# Patient Record
Sex: FEMALE
[De-identification: names, ages, dates, MRNs, and addresses within clinical notes are randomized per-mention and may not be internally consistent; named-entity substitution may affect disease eponyms.]

---

## 2017-01-27 ENCOUNTER — RX ONLY (OUTPATIENT)
Age: 82
Setting detail: RX ONLY
End: 2017-01-27

## 2017-01-27 ENCOUNTER — APPOINTMENT (RX ONLY)
Dept: URBAN - METROPOLITAN AREA CLINIC 106 | Facility: CLINIC | Age: 82
Setting detail: DERMATOLOGY
End: 2017-01-27

## 2017-01-27 DIAGNOSIS — L29.89 OTHER PRURITUS: ICD-10-CM

## 2017-01-27 DIAGNOSIS — R21 RASH AND OTHER NONSPECIFIC SKIN ERUPTION: ICD-10-CM

## 2017-01-27 DIAGNOSIS — F42.4 EXCORIATION (SKIN-PICKING) DISORDER: ICD-10-CM

## 2017-01-27 PROBLEM — S20.409A UNSPECIFIED SUPERFICIAL INJURIES OF UNSPECIFIED BACK WALL OF THORAX, INITIAL ENCOUNTER: Status: ACTIVE | Noted: 2017-01-27

## 2017-01-27 PROBLEM — L29.8 OTHER PRURITUS: Status: ACTIVE | Noted: 2017-01-27

## 2017-01-27 PROCEDURE — ? COUNSELING

## 2017-01-27 PROCEDURE — 11100: CPT

## 2017-01-27 PROCEDURE — ? BIOPSY BY SHAVE METHOD

## 2017-01-27 PROCEDURE — 99213 OFFICE O/P EST LOW 20 MIN: CPT | Mod: 25

## 2017-01-27 RX ORDER — BETAMETHASONE DIPROPIONATE 0.5 MG/G
CREAM TOPICAL
Qty: 1 | Refills: 2 | Status: ERX

## 2017-01-27 ASSESSMENT — LOCATION SIMPLE DESCRIPTION DERM
LOCATION SIMPLE: LEFT UPPER BACK
LOCATION SIMPLE: RIGHT UPPER BACK

## 2017-01-27 ASSESSMENT — LOCATION DETAILED DESCRIPTION DERM
LOCATION DETAILED: LEFT SUPERIOR UPPER BACK
LOCATION DETAILED: RIGHT INFERIOR MEDIAL UPPER BACK
LOCATION DETAILED: RIGHT SUPERIOR UPPER BACK

## 2017-01-27 ASSESSMENT — LOCATION ZONE DERM: LOCATION ZONE: TRUNK

## 2017-01-27 NOTE — PROCEDURE: BIOPSY BY SHAVE METHOD
Dressing: pressure dressing
Cryotherapy Text: The wound bed was treated with cryotherapy after the biopsy was performed.
Render Post-Care Instructions In Note?: no
Consent: Written consent was obtained and risks were reviewed including but not limited to scarring, infection, bleeding, scabbing, incomplete removal, nerve damage and allergy to anesthesia.
Post-Care Instructions: I reviewed with the patient in detail post-care instructions. Patient is to keep the biopsy site dry overnight, and then apply bacitracin twice daily until healed. Patient may apply hydrogen peroxide soaks to remove any crusting.
Anesthesia Type: 1% lidocaine with epinephrine
Silver Nitrate Text: The wound bed was treated with silver nitrate after the biopsy was performed.
Biopsy Type: H and E
Hemostasis: Electrocautery
Electrodesiccation And Curettage Text: The wound bed was treated with electrodesiccation and curettage after the biopsy was performed.
Body Location Override (Optional - Billing Will Still Be Based On Selected Body Map Location If Applicable): Right mid back
Additional Anesthesia Volume In Cc (Will Not Render If 0): 0
Electrodesiccation Text: The wound bed was treated with electrodesiccation after the biopsy was performed.
Detail Level: Detailed
Lab Facility: [address] [facility]
Notification Instructions: Patient will be notified of biopsy results. However, patient instructed to call the office if not contacted within 2 weeks.
Lab: [facility]
Size Of Lesion In Cm: 0.7
Billing Type: [employer]
Wound Care: Vaseline
Curettage Text: The wound bed was treated with curettage after the biopsy was performed.
Anesthesia Volume In Cc (Will Not Render If 0): 0.5
Biopsy Method: Personna blade
Type Of Destruction Used: Curettage

## 2017-02-10 ENCOUNTER — APPOINTMENT (RX ONLY)
Dept: URBAN - METROPOLITAN AREA CLINIC 106 | Facility: CLINIC | Age: 82
Setting detail: DERMATOLOGY
End: 2017-02-10

## 2017-02-10 DIAGNOSIS — L27.0 GENERALIZED SKIN ERUPTION DUE TO DRUGS AND MEDICAMENTS TAKEN INTERNALLY: ICD-10-CM

## 2017-02-10 PROCEDURE — ? COUNSELING

## 2017-02-10 PROCEDURE — 99213 OFFICE O/P EST LOW 20 MIN: CPT

## 2017-02-10 ASSESSMENT — LOCATION SIMPLE DESCRIPTION DERM: LOCATION SIMPLE: RIGHT UPPER BACK

## 2017-02-10 ASSESSMENT — LOCATION DETAILED DESCRIPTION DERM: LOCATION DETAILED: RIGHT SUPERIOR MEDIAL UPPER BACK

## 2017-02-10 ASSESSMENT — LOCATION ZONE DERM: LOCATION ZONE: TRUNK

## 2017-02-10 NOTE — PROCEDURE: MIPS QUALITY
Quality 130: Documentation Of Current Medications In The Medical Record: Current Medications Documented
Detail Level: Detailed
Quality 226: Preventive Care And Screening: Tobacco Use: Screening And Cessation Intervention: Patient screened for tobacco and never smoked
Quality 111:Pneumonia Vaccination Status For Older Adults: Pneumococcal Vaccination Previously Received
Quality 47: Advance Care Plan: Advance Care Planning discussed and documented in the medical record; patient did not wish or was not able to name a surrogate decision maker or provide an advance care plan.
Quality 110: Preventive Care And Screening: Influenza Immunization: Influenza Immunization previously received during influenza season
Quality 131: Pain Assessment And Follow-Up: Pain assessment using a standardized tool is documented as negative, no follow-up plan required

## 2019-07-15 NOTE — PROCEDURE: COUNSELING
[facility] -- Elida Douglas, Ayaz Macias Pannella, Patel, Savella  Office # [phone]    Follow Up:  SOB, Dyspnea      Subjective/Observations: Awake and alert, denies any respiratory discomfort, no SOB, MUNOZ.  Remains non-orthopneic.  Denies CP or palpitations    REVIEW OF SYSTEMS: All other review of systems is negative unless indicated above  PAST MEDICAL & SURGICAL HISTORY:  Myeloproliferative disease  Anal fissure  Elevated red blood cell count  Atrial fibrillation: on coumadin  Skin cancer: of back - removed  Type 2 diabetes mellitus: not on insulin  Hypercholesterolemia  HTN (hypertension)  Myocardial infarction: 8 yrs ago  Hearing loss  S/P ORIF (open reduction internal fixation) fracture: right hip pinning  - 5 yrs ago  History of cataract surgery: 03/2016,  06/2016    MEDICATIONS  (STANDING):  ALBUTerol    0.083% 2.5 milliGRAM(s) Nebulizer every 12 hours  aspirin enteric coated 81 milliGRAM(s) Oral daily  atorvastatin 40 milliGRAM(s) Oral at bedtime  cholecalciferol 1000 Unit(s) Oral daily  dextrose 5%. 1000 milliLiter(s) (50 mL/Hr) IV Continuous <Continuous>  dextrose 50% Injectable 12.5 Gram(s) IV Push once  dextrose 50% Injectable 25 Gram(s) IV Push once  dextrose 50% Injectable 25 Gram(s) IV Push once  furosemide    Tablet 40 milliGRAM(s) Oral daily  hydroxyurea 500 milliGRAM(s) Oral daily  insulin lispro (HumaLOG) corrective regimen sliding scale   SubCutaneous three times a day before meals  insulin lispro (HumaLOG) corrective regimen sliding scale   SubCutaneous at bedtime  lisinopril 20 milliGRAM(s) Oral daily  metoprolol succinate ER 25 milliGRAM(s) Oral daily  pantoprazole    Tablet 40 milliGRAM(s) Oral before breakfast    MEDICATIONS  (PRN):  dextrose 40% Gel 15 Gram(s) Oral once PRN Blood Glucose LESS THAN 70 milliGRAM(s)/deciliter  glucagon  Injectable 1 milliGRAM(s) IntraMuscular once PRN Glucose LESS THAN 70 milligrams/deciliter    Allergies    No Known Allergies    Intolerances    Vital Signs Last 24 Hrs  T(C): 36.4 (15 Jul 2019 05:28), Max: 36.6 (14 Jul 2019 23:10)  T(F): 97.5 (15 Jul 2019 05:28), Max: 97.9 (14 Jul 2019 23:10)  HR: 67 (15 Jul 2019 07:27) (66 - 82)  BP: 113/61 (15 Jul 2019 05:28) (113/61 - 127/78)  BP(mean): --  RR: 20 (15 Jul 2019 05:28) (20 - 26)  SpO2: 98% (15 Jul 2019 07:27) (96% - 99%)  I&O's Summary    PHYSICAL EXAM:  TELE: Afib  Constitutional: NAD, awake and alert, well-developed  HEENT: Moist Mucous Membranes, Anicteric  Pulmonary: Non-labored, breath sounds are clear bilaterally, No wheezing, rales or rhonchi  Cardiovascular: irregularly irregular, S1 and S2, + murmurs.  No rubs, gallops or clicks  Gastrointestinal: Bowel Sounds present, soft, nontender.   Lymph: No peripheral edema. No lymphadenopathy.  Skin: No visible rashes or ulcers.  Psych:  Mood & affect appropriate      LABS: All Labs Reviewed:                        12.6   27.34 )-----------( 246      ( 15 Jul 2019 06:06 )             44.9                         13.4   27.87 )-----------( 276      ( 14 Jul 2019 07:18 )             47.2                         12.6   23.03 )-----------( 250      ( 13 Jul 2019 06:17 )             44.5     15 Jul 2019 06:06    140    |  103    |  40     ----------------------------<  222    4.2     |  28     |  1.20   14 Jul 2019 07:18    138    |  99     |  32     ----------------------------<  226    4.1     |  29     |  1.20   13 Jul 2019 06:17    139    |  103    |  24     ----------------------------<  237    4.0     |  28     |  1.10     Ca    9.0        15 Jul 2019 06:06  Ca    9.5        14 Jul 2019 07:18  Ca    9.2        13 Jul 2019 06:17    TPro  6.7    /  Alb  3.7    /  TBili  1.1    /  DBili  x      /  AST  10     /  ALT  17     /  AlkPhos  177    14 Jul 2019 07:18    PT/INR - ( 15 Jul 2019 06:06 )   PT: 15.6 sec;   INR: 1.37 ratio      < from: TTE Echo Doppler w/o Cont (07.11.19 @ 11:37) >     EXAM:  ECHO TTE WO CON COMP W DOPPLR         PROCEDURE DATE:  07/11/2019        INTERPRETATION:  INDICATION: dyspnea  Referring M.D.:Joe  Blood Pressure 126/75        Weight (kg) :55     Height (cm):162       BSA (sq m): 1.6  Technician: PH    Dimensions:    LA 4.3       Normal Values: 2.0 - 4.0 cm    Ao 2.7        Normal Values: 2.0 - 3.8 cm  SEPTUM 1.1       Normal Values: 0.6 - 1.2 cm  PWT 1.1       Normal Values: 0.6 - 1.1 cm  LVIDd 4.0         Normal Values: 3.0 - 5.6 cm  LVIDs 2.0       Normal Values: 1.8 - 4.0 cm      OBSERVATIONS:  Technically difficult study  Mitral Valve: Dense mitral annular calcification and calcified mitral   leaflets. Mild mitral regurgitation.  Aortic Valve/Aorta: Normal trileaflet aortic valve.  Tricuspid Valve: Normal tricuspid valve. Mild tricuspid regurgitation.  Pulmonic Valve: The pulmonic valve is not well visualized. Probably   normal.  Left Atrium: Severe enlargement  Right Atrium: Mild enlargement  Left Ventricle: Overall preserved leftventricular systolic function. The   ejection fraction is approximately 60%.  Right Ventricle: Normal right ventricular size and function.  Pericardium/Pleura: No pericardial effusion noted.  Pulmonary/RV Pressure: The right ventricular systolic pressure is   estimated to be 61mmHg, assuming that the right atrial pressure is   estimated to be 20 mmHg. This is consistent with pulmonary hypertension.   The IVC is extremely dilated (2.8cm) and appears noncollapsible. The   right atrial pressure is estimated to be 15 to 20 mmHg.  LV Diastolic Function: E/e' significant elevated consistent with   increased left ventricular filling pressures    Conclusion: Technically difficult study. Overall preserved left   ventricular systolic function. EF 60%. Elevated left ventricular filling   pressures. Severe pulmonary hypertension with severely elevated right   atrial pressures based on an IVC diameter of 2.8 cm that is   noncollapsible.      TON EDWARDS M.D., ATTENDING CARDIOLOGIST  This document has been electronically signed. Jul 12 2019  1:54PM  < end of copied text >    PTT - ( 13 Jul 2019 06:17 )  PTT:40.7 sec    < from: CT Chest w/ IV Cont (07.09.19 @ 20:12) >    EXAM:  CT ABDOMEN AND PELVIS OC IC                          EXAM:  CT CHEST IC                          PROCEDURE DATE:  07/09/2019      INTERPRETATION:  CLINICAL INFORMATION: Shortness of breath. Left lower   quadrant abdominal pain and diarrhea.    COMPARISON: None.    PROCEDURE:   CT of the Chest, Abdomen and Pelvis was performed with intravenous   contrast.   Intravenous contrast: 100 ml Omnipaque 350. 0 ml discarded.  Oral contrast: Positive contrast was administered.  Sagittal and coronal reformats were performed.    FINDINGS:    CHEST:     LUNGS AND LARGE AIRWAYS: Patent central airways. Geographic areas of mild   groundglass air space opacity, may reflect air trapping. 3 mm left lower   lobe pulmonary nodule (series 2, image 56). In the absence of known risk   factors, no further routine follow-up is recommended.  PLEURA: No pleural effusion.  VESSELS: Atherosclerosis of the thoracic aorta which is otherwise normal   in caliber.  HEART: Heart size is normal. No pericardialeffusion. Aortic valvular,   coronary artery, and mitral annular calcification.  MEDIASTINUM AND RASTA: Numerous small volume mediastinal and bilateral   hilar nodes.  CHEST WALL AND LOWER NECK: Within normal limits.    ABDOMEN AND PELVIS:    LIVER: Within normal limits.  BILE DUCTS: Normal caliber.  GALLBLADDER: Within normal limits.  SPLEEN: Splenomegaly measuring up to 16.0 cm in the AP dimension..  PANCREAS: Within normal limits.  ADRENALS: Within normal limits.  KIDNEYS/URETERS: Bilateral renal cysts and low-attenuation lesions too   small to accurately characterize. No hydronephrosis.    BLADDER: Within normal limits.  REPRODUCTIVE ORGANS: Uterus and adnexa within normal limits.    BOWEL: No bowel obstruction. Colonic diverticulosis without evidence of   acute diverticulitis. Appendix is normal.  PERITONEUM: No ascites.  VESSELS:  Atherosclerosis of the abdominal aorta and its branch vessels.  RETROPERITONEUM: No lymphadenopathy.    ABDOMINAL WALL: Within normal limits.  BONES: Status post open reduction internal fixation of the right hip.   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 Multilevel degenerative changes   of the spine.    IMPRESSION:     Nonspecific geographic groundglass airspace opacity, suggestive of air   trapping.    Numerous predominantly small volume mediastinal and bilateral hilar   nodes, of unclear etiology.    Colonic diverticulosis without diverticulitis or evidence of acute   inflammation. No bowel obstruction.    Splenomegaly.    ANA NEWTON, ATTENDING RADIOLOGIST  This document has been electronically signed. Jul 9 2019  8:57PM     < end of copied text >    < from: 12 Lead ECG (07.09.19 @ 18:13) >    Ventricular Rate 68 BPM    Atrial Rate 234 BPM    QRS Duration 76 ms    Q-T Interval 402 ms    QTC Calculation(Bezet) 427 ms    R Axis -2 degrees    T Axis -17 degrees    Diagnosis Line Atrial fibrillation  T wave abnormality, consider inferior ischemia  Abnormal ECG  When compared with ECG of 14-DEC-2012 14:23,  No significant change was found  Confirmed by Gilberto SERRA, Ralf ([id number]) on 7/10/2019 12:01:24 PM    < end of copied text > Detail Level: Detailed